# Patient Record
Sex: MALE | Race: OTHER | HISPANIC OR LATINO | URBAN - METROPOLITAN AREA
[De-identification: names, ages, dates, MRNs, and addresses within clinical notes are randomized per-mention and may not be internally consistent; named-entity substitution may affect disease eponyms.]

---

## 2024-01-08 ENCOUNTER — HOSPITAL ENCOUNTER (EMERGENCY)
Facility: HOSPITAL | Age: 39
Discharge: HOME | End: 2024-01-08
Attending: EMERGENCY MEDICINE

## 2024-01-08 VITALS
HEART RATE: 83 BPM | TEMPERATURE: 98 F | HEIGHT: 69 IN | BODY MASS INDEX: 30.36 KG/M2 | DIASTOLIC BLOOD PRESSURE: 74 MMHG | OXYGEN SATURATION: 99 % | SYSTOLIC BLOOD PRESSURE: 113 MMHG | RESPIRATION RATE: 18 BRPM | WEIGHT: 205 LBS

## 2024-01-08 DIAGNOSIS — S05.02XA ABRASION OF LEFT CORNEA, INITIAL ENCOUNTER: Primary | ICD-10-CM

## 2024-01-08 DIAGNOSIS — H57.12 LEFT EYE PAIN: ICD-10-CM

## 2024-01-08 PROCEDURE — 99284 EMERGENCY DEPT VISIT MOD MDM: CPT

## 2024-01-08 PROCEDURE — 25000003 PHARM REV CODE 250: Performed by: NURSE PRACTITIONER

## 2024-01-08 PROCEDURE — 63600175 PHARM REV CODE 636 W HCPCS

## 2024-01-08 PROCEDURE — 96372 THER/PROPH/DIAG INJ SC/IM: CPT

## 2024-01-08 RX ORDER — KETOROLAC TROMETHAMINE 30 MG/ML
30 INJECTION, SOLUTION INTRAMUSCULAR; INTRAVENOUS
Status: COMPLETED | OUTPATIENT
Start: 2024-01-08 | End: 2024-01-08

## 2024-01-08 RX ORDER — PROPARACAINE HYDROCHLORIDE 5 MG/ML
2 SOLUTION/ DROPS OPHTHALMIC
Status: COMPLETED | OUTPATIENT
Start: 2024-01-08 | End: 2024-01-08

## 2024-01-08 RX ORDER — NAPROXEN 500 MG/1
500 TABLET ORAL 2 TIMES DAILY PRN
Qty: 30 TABLET | Refills: 0 | Status: SHIPPED | OUTPATIENT
Start: 2024-01-08

## 2024-01-08 RX ORDER — ERYTHROMYCIN 5 MG/G
OINTMENT OPHTHALMIC
Qty: 5 G | Refills: 0 | Status: SHIPPED | OUTPATIENT
Start: 2024-01-08

## 2024-01-08 RX ADMIN — KETOROLAC TROMETHAMINE 30 MG: 30 INJECTION, SOLUTION INTRAMUSCULAR; INTRAVENOUS at 06:01

## 2024-01-08 RX ADMIN — FLUORESCEIN SODIUM 1 EACH: 1 STRIP OPHTHALMIC at 07:01

## 2024-01-08 RX ADMIN — PROPARACAINE HYDROCHLORIDE 2 DROP: 5 SOLUTION/ DROPS OPHTHALMIC at 07:01

## 2024-01-09 NOTE — DISCHARGE INSTRUCTIONS
Ludmila por venir a nuestro Departamento de Emergencias hoy. Es importante recordar que algunos problemas o condiciones médicas son difíciles de diagnosticar y es posible que no se encuentren o aborden van willams visita al Departamento de Emergencias. Estas condiciones a menudo comienzan con síntomas inespecíficos y solo se pueden diagnosticar en visitas de seguimiento con willams médico de atención primaria o especialista cuando los síntomas continúan o cambian. Recuerde que todas las condiciones médicas pueden cambiar y no podemos predecir cómo se sentirá mañana o al día siguiente. Regrese a la reji de emergencias si tiene preguntas/inquietudes, síntomas nuevos/preocupantes, empeoramiento o falta de mejora.    Asegúrese de hacer un seguimiento con willams médico de atención primaria y revisar con ellos todos los laboratorios/imágenes/pruebas que se realizaron van willams visita a la reji de emergencias. Es muy común que identifiquemos hallazgos incidentales no emergentes que deben ser objeto de seguimiento con willams médico de atención primaria. Algunos laboratorios/imágenes/pruebas pueden estar fuera del rango normal y requieren un seguimiento que no sea de emergencia y/o más investigación/tratamiento/procedimientos/pruebas para ayudar a diagnosticar/excluir/prevenir complicaciones u otras afecciones médicas potencialmente graves. Algunas anormalidades pueden no ray sido discutidas o abordadas van willams visita a la reji de emergencias. Es posible que algunos resultados de laboratorio no regresen van willams visita a la reji de emergencias, jillian se puede acceder a ellos descargando la aplicación gratuita [facility] o visitando [email or web address] Es importante que revise con willams médico todas las pruebas de laboratorio/imágenes/pruebas que estén fuera del rango normal.    Ana visita a la reji de emergencias no reemplaza ana visita de atención primaria, y muchas pruebas de detección o de seguimiento no pueden ser  ordenadas por un médico de emergencia ni realizadas por la reji de emergencias. Algunas pruebas pueden incluso requerir aprobación previa.    Si no tiene un médico de atención primaria, puede comunicarse con el que figura en la documentación del kendall o también puede llamar al mostrador de citas de la [facility] al [phone] o a [address] [place] [address] al [phone] para programar ana almaz. almaz, o establecer atención con un médico de atención primaria o incluso un especialista y para obtener información sobre los recursos locales. Es importante para willams nayeli que tenga un médico de atención primaria.    [place] todos los medicamentos según las indicaciones. Hemos hecho todo lo posible para seleccionar un medicamento para usted que tratará willams condición; sin embargo, todos los medicamentos pueden tener efectos secundarios y es imposible predecir qué medicamentos pueden causarle efectos secundarios o cuáles (si los hay) esos medicamentos le pueden tasneem. Si siente que está teniendo un efecto negativo o un efecto secundario de algún medicamento, debe dejar de veronica esos medicamentos de inmediato y buscar atención médica. Si siente que está teniendo ana reacción potencialmente mortal, llame al 911.    No conduzca, nade, suba a [place] [employer], se bañe, opere maquinaria pesada, grayson alcohol o tome medicamentos potencialmente sedantes, firme ningún documento legal o tome decisiones importantes van 24 horas si ha recibido algún medicamento para el dolor, sedantes o alteradores del estado de ánimo. medicamentos van willams visita a la reji de emergencias o dentro de las 24 horas de haberlos tomado si se los escalera recetado.    Puede encontrar recursos adicionales para dentistas, audífonos, equipos médicos duraderos, farmacias de bajo costo y otros recursos en [email or web address]

## 2024-01-09 NOTE — ED PROVIDER NOTES
Encounter Date: 1/8/2024    SCRIBE #1 NOTE: I, Treasure Tejeda, am scribing for, and in the presence of,  Holdsworth, Alayna, PA-C. I have scribed the following portions of the note - Other sections scribed: HPI, ROS.       History     Chief Complaint   Patient presents with    Eye Pain     Pt to ER with right eye pain s/o getting something in eye while using the  at work. Pt reports blurred vision. 9/10 pain      Syed Alston is a 38 y.o. male, with no pertinent PMHx, who presents to the ED with 8/10 left eye pain onset PTA. Patient reports he was grinding metal and thinks some might have gotten in his eye. Endorses blurred vision, watering, and redness of the effected eye and area that he states is worsening. Pt declined at  and had his friend translate for him instead. Denies trying to flush his eyes or use of contact lenses. No other exacerbating or alleviating factors.     The history is provided by the patient. No  was used.     Review of patient's allergies indicates:  No Known Allergies  History reviewed. No pertinent past medical history.  History reviewed. No pertinent surgical history.  History reviewed. No pertinent family history.  Social History     Tobacco Use    Smoking status: Never    Smokeless tobacco: Never   Substance Use Topics    Alcohol use: Never    Drug use: Never     Review of Systems   Constitutional:  Negative for chills, diaphoresis and fever.   Eyes:  Positive for pain (left), redness (left) and visual disturbance (blurred vision in left eye). Negative for discharge and itching.   Gastrointestinal:  Negative for nausea and vomiting.   Skin:  Negative for wound.   Neurological:  Negative for dizziness, syncope, weakness, light-headedness and headaches.   All other systems reviewed and are negative.      Physical Exam     Initial Vitals [01/08/24 1737]   BP Pulse Resp Temp SpO2   129/81 93 18 98.1 °F (36.7 °C) 98 %      MAP       --          Physical Exam    Nursing note and vitals reviewed.  Constitutional: Vital signs are normal. He appears well-developed and well-nourished. He is not diaphoretic. He is active. He does not appear ill. No distress.   HENT:   Head: Normocephalic and atraumatic.   Right Ear: External ear normal.   Left Ear: External ear normal.   Nose: Nose normal.   Eyes: EOM and lids are normal. Pupils are equal, round, and reactive to light. Lids are everted and swept, no foreign bodies found. Right eye exhibits no discharge. Left eye exhibits no chemosis, no discharge, no exudate and no hordeolum. No foreign body present in the left eye. Left conjunctiva is injected. Left conjunctiva has no hemorrhage. No scleral icterus. Right eye exhibits normal extraocular motion and no nystagmus. Left eye exhibits normal extraocular motion and no nystagmus.   Slit lamp exam:       The left eye shows corneal abrasion. The left eye shows no corneal flare, no corneal ulcer, no foreign body, no hyphema, no hypopyon and no anterior chamber bulge.   Left eye: pH of 7. IOP- 15,16. Proparacaine eye drops provided relief.  1 cm 12 o'clock position corneal abrasion appreciated. Normal EOMs. PERRL.    Neck:   Normal range of motion.  Pulmonary/Chest: Effort normal. No respiratory distress.   Abdominal: He exhibits no distension.   Musculoskeletal:         General: Normal range of motion.      Cervical back: Normal range of motion.     Neurological: He is alert and oriented to person, place, and time. GCS eye subscore is 4. GCS verbal subscore is 5. GCS motor subscore is 6.   Skin: Skin is dry. Capillary refill takes less than 2 seconds.         ED Course   Procedures  Labs Reviewed - No data to display       Imaging Results    None          Medications   fluorescein ophthalmic strip 1 each (has no administration in time range)   proparacaine 0.5 % ophthalmic solution 2 drop (has no administration in time range)   ketorolac injection 30 mg (30 mg  Intramuscular Given 1/8/24 1844)     Medical Decision Making  38 y.o. male, with no pertinent PMHx, who presents to the ED with 8/10 left eye pain onset PTA.  Patient's chart and medical history reviewed.    Ddx:  Corneal abrasion  Corneal ulcer  Conjunctivitis  Stye  FB in eye    Patient's vitals reviewed.  Afebrile, no respiratory distress, and nontoxic-appearing in the ED. patient had a left lateral injected conjunctivae with normal EOMs and PERRL.  Patient given Toradol for pain.  Eye was flushed with normal saline. Normal visual acuity screening. pH of 7. IOP- 15,16. Proparacaine eye drops provided relief.  1 cm 12 o'clock position corneal abrasion appreciated. Normal EOMs. PERRL.  Lid everted and no foreign body appreciated.  Discussed with patient to avoid rubbing the eye and do warm compresses, he verbalized understanding.  Patient was sent home with erythromycin ointment and naproxen as needed for pain. Patient agrees with this plan. Discussed with him strict return precautions, he verbalized understanding. Patient is stable for discharge.     Risk  Prescription drug management.            Scribe Attestation:   Scribe #1: I performed the above scribed service and the documentation accurately describes the services I performed. I attest to the accuracy of the note.                               Clinical Impression:  Final diagnoses:  [H57.12] Left eye pain  [S05.02XA] Abrasion of left cornea, initial encounter (Primary)          ED Disposition Condition    Discharge Stable          ED Prescriptions       Medication Sig Dispense Start Date End Date Auth. Provider    erythromycin (ROMYCIN) ophthalmic ointment Place a 1/2 inch ribbon of ointment into the lower left eyelid every 6 hours for 5-7 days 5 g 1/8/2024 -- Holdsworth, Alayna, PA-C    naproxen (NAPROSYN) 500 MG tablet Take 1 tablet (500 mg total) by mouth 2 (two) times daily as needed (Pain). 30 tablet 1/8/2024 -- Holdsworth, Alayna, PA-C           Follow-up Information       Follow up With Specialties Details Why Contact Info    [facility] - Emergency Dept Emergency Medicine  If symptoms worsen [address] [facility] [place] [address] [phone]          I, Alayna Holdsworth,PA-C, personally performed the services described in this documentation. All medical record entries made by the scribe were at my direction and in my presence.  I have reviewed the chart and agree that the record reflects my personal performance and is accurate and complete.      Holdsworth, Alayna, PA-C  01/08/24 1912